# Patient Record
Sex: FEMALE | URBAN - METROPOLITAN AREA
[De-identification: names, ages, dates, MRNs, and addresses within clinical notes are randomized per-mention and may not be internally consistent; named-entity substitution may affect disease eponyms.]

---

## 2022-01-25 ENCOUNTER — NURSE TRIAGE (OUTPATIENT)
Dept: OTHER | Facility: OTHER | Age: 32
End: 2022-01-25

## 2022-01-25 DIAGNOSIS — Z20.822 SUSPECTED SEVERE ACUTE RESPIRATORY SYNDROME CORONAVIRUS 2 (SARS-COV-2) INFECTION: Primary | ICD-10-CM

## 2022-01-25 PROCEDURE — U0005 INFEC AGEN DETEC AMPLI PROBE: HCPCS | Performed by: FAMILY MEDICINE

## 2022-01-25 PROCEDURE — U0003 INFECTIOUS AGENT DETECTION BY NUCLEIC ACID (DNA OR RNA); SEVERE ACUTE RESPIRATORY SYNDROME CORONAVIRUS 2 (SARS-COV-2) (CORONAVIRUS DISEASE [COVID-19]), AMPLIFIED PROBE TECHNIQUE, MAKING USE OF HIGH THROUGHPUT TECHNOLOGIES AS DESCRIBED BY CMS-2020-01-R: HCPCS | Performed by: FAMILY MEDICINE

## 2022-01-25 NOTE — TELEPHONE ENCOUNTER
Regarding: covid symptomatic-fever,diarrhea, congestion, sore throat  ----- Message from Savanna Alberts sent at 1/25/2022 12:56 PM EST -----  "I have a fever, body aches, headache, congestion, sore throat, and diarrhea "

## 2022-01-25 NOTE — TELEPHONE ENCOUNTER
1  Were you within 6 feet or less, for up to 15 minutes or more with a person that has a confirmed COVID-19 test? Denies  2  What was the date of your exposure? N/A  3  Are you experiencing any symptoms attributed to the virus?  (Assess for SOB, cough, fever, difficulty breathing) Fever, body aches, headache, congestion, sore throat, diarrhea, onset 1/23/22  4  HIGH RISK: Do you have any history heart or lung conditions, weakened immune system, diabetes, Asthma, CHF, HIV, COPD, Chemo, renal failure, sickle cell, etc? Denies   5  PREGNANCY: Are you pregnant or did you recently give birth? Denies  6   VACCINE: "Have you gotten the COVID-19 vaccine?" If Yes ask: "Which one, how many shots, when did you get it?" 2

## 2022-01-26 ENCOUNTER — TELEPHONE (OUTPATIENT)
Dept: OTHER | Facility: OTHER | Age: 32
End: 2022-01-26

## 2022-01-26 LAB — SARS-COV-2 RNA RESP QL NAA+PROBE: POSITIVE

## 2022-01-26 NOTE — RESULT ENCOUNTER NOTE
I spoke with Rob Laura and let her know that her COVID-19 swab was positive  Continue symptomatic treatment   Advised she implement home isolation measures including

## 2022-01-26 NOTE — TELEPHONE ENCOUNTER
Your test for the novel coronavirus, also known as COVID-19, was positive  The sample showed that the virus was present  Positive COVID-19 test results are reportable to the [place] Department of Health  You may receive a call from trained public health staff to conduct an interview  It is important to answer their call  They will ask you to verify who you are  During the call they will ask you about what symptoms you have, what you did before you got sick, and who you were close to while sick  The health department does this to make sure everyone stays healthy and to reduce the spread of the virus  If you would like to verify if the caller does in fact work in contact tracing, call the [address] [place] [address] at [employer] ([phone])  For additional information, please visit the [facility]  website: [email or web address] [email or web address]     If you have any additional questions, we can schedule a virtual visit for you with a provider or call the [facility] hotline [phone], option 7, for care advice    For additional information, please visit the Coronavirus FAQ on the [employer] home page ([facility]  org)

## 2024-04-11 ENCOUNTER — OFFICE VISIT (OUTPATIENT)
Dept: URGENT CARE | Facility: CLINIC | Age: 34
End: 2024-04-11
Payer: COMMERCIAL

## 2024-04-11 VITALS
WEIGHT: 205 LBS | OXYGEN SATURATION: 98 % | HEIGHT: 66 IN | TEMPERATURE: 97.5 F | RESPIRATION RATE: 18 BRPM | SYSTOLIC BLOOD PRESSURE: 141 MMHG | BODY MASS INDEX: 32.95 KG/M2 | HEART RATE: 123 BPM | DIASTOLIC BLOOD PRESSURE: 96 MMHG

## 2024-04-11 DIAGNOSIS — J40 BRONCHITIS: Primary | ICD-10-CM

## 2024-04-11 PROCEDURE — 99213 OFFICE O/P EST LOW 20 MIN: CPT | Performed by: PHYSICIAN ASSISTANT

## 2024-04-11 RX ORDER — BUPROPION HYDROCHLORIDE 300 MG/1
300 TABLET ORAL DAILY
COMMUNITY
Start: 2024-03-20 | End: 2025-03-20

## 2024-04-11 RX ORDER — ALBUTEROL SULFATE 90 UG/1
2 AEROSOL, METERED RESPIRATORY (INHALATION) EVERY 6 HOURS PRN
Qty: 8.5 G | Refills: 0 | Status: SHIPPED | OUTPATIENT
Start: 2024-04-11

## 2024-04-11 RX ORDER — AZITHROMYCIN 250 MG/1
TABLET, FILM COATED ORAL
Qty: 6 TABLET | Refills: 0 | Status: SHIPPED | OUTPATIENT
Start: 2024-04-11 | End: 2024-04-15

## 2024-04-11 RX ORDER — ESCITALOPRAM OXALATE 10 MG/1
10 TABLET ORAL DAILY
COMMUNITY
Start: 2024-03-20

## 2024-04-11 RX ORDER — METHYLPHENIDATE HYDROCHLORIDE 27 MG/1
27 TABLET, EXTENDED RELEASE ORAL
COMMUNITY
Start: 2024-03-20

## 2024-04-11 NOTE — PROGRESS NOTES
[facility]        NAME: Anastacia Jacob is a 33 y.o. female  : 1990    MRN: [id number]  DATE: 2024  TIME: 5:48 PM    Assessment and Plan   Bronchitis [J40]  1. Bronchitis  azithromycin (ZITHROMAX) 250 mg tablet    albuterol (ProAir HFA) 90 mcg/act inhaler            Patient Instructions     Take medicine as prescribed  Follow up with PCP in 3-5 days.  Proceed to  ER if symptoms worsen.    If tests have been performed at [facility], our office will contact you with results if changes need to be made to the care plan discussed with you at the visit.  You can review your full results on [facility].    Chief Complaint     Chief Complaint   Patient presents with    chest congestion     Chest congestion, cough and chest feels tight since Monday   Pneumonia in Nov         History of Present Illness       Cough  This is a new problem. Episode onset: 4 days ago. Associated symptoms include chills, myalgias, nasal congestion, rhinorrhea, a sore throat and wheezing. Pertinent negatives include no ear pain or fever. Treatments tried: otc cold and flu medicine. Her past medical history is significant for pneumonia (). There is no history of asthma.       Review of Systems   Review of Systems   Constitutional:  Positive for chills. Negative for fatigue and fever.   HENT:  Positive for rhinorrhea and sore throat. Negative for congestion and ear pain.    Respiratory:  Positive for cough and wheezing. Negative for chest tightness.    Cardiovascular:  Negative for palpitations.   Gastrointestinal:  Negative for abdominal pain, diarrhea, nausea and vomiting.   Musculoskeletal:  Positive for myalgias.         Current Medications       Current Outpatient Medications:     albuterol (ProAir HFA) 90 mcg/act inhaler, Inhale 2 puffs every 6 (six) hours as needed for wheezing, Disp: 8.5 g, Rfl: 0    azithromycin (ZITHROMAX) 250 mg tablet, Take 2 tablets today then 1 tablet daily x 4 days, Disp: 6 tablet,  "Rfl: 0    buPROPion (WELLBUTRIN XL) 300 mg 24 hr tablet, Take 300 mg by mouth daily, Disp: , Rfl:     escitalopram (LEXAPRO) 10 mg tablet, Take 10 mg by mouth daily, Disp: , Rfl:     Methylphenidate HCl ER 27 MG TB24, Take 27 mg by mouth, Disp: , Rfl:     Current Allergies     Allergies as of 04/11/2024 - Reviewed 04/11/2024   Allergen Reaction Noted    Lorazepam Vomiting 12/25/2012            The following portions of the patient's history were reviewed and updated as appropriate: allergies, current medications, past family history, past medical history, past social history, past surgical history and problem list.     History reviewed. No pertinent past medical history.    History reviewed. No pertinent surgical history.    History reviewed. No pertinent family history.      Medications have been verified.        Objective   /96   Pulse (!) 123   Temp 97.5 °F (36.4 °C) (Tympanic)   Resp 18   Ht 5' 6\" (1.676 m)   Wt 93 kg (205 lb)   LMP 04/11/2024   SpO2 98%   BMI 33.09 kg/m²   Patient's last menstrual period was 04/11/2024.       Physical Exam     Physical Exam  Constitutional:       Appearance: She is well-developed.   HENT:      Head: Normocephalic.      Right Ear: Hearing, tympanic membrane, ear canal and external ear normal. There is no impacted cerumen.      Left Ear: Hearing, tympanic membrane, ear canal and external ear normal. There is no impacted cerumen.      Nose: No mucosal edema, congestion or rhinorrhea.      Right Sinus: Maxillary sinus tenderness present.      Left Sinus: Maxillary sinus tenderness present.      Mouth/Throat:      Mouth: Mucous membranes are moist.      Pharynx: No oropharyngeal exudate or posterior oropharyngeal erythema.      Tonsils: No tonsillar exudate.   Cardiovascular:      Rate and Rhythm: Normal rate and regular rhythm.      Heart sounds: Normal heart sounds. No murmur heard.     No friction rub. No gallop.   Pulmonary:      Effort: Pulmonary effort is normal. " No respiratory distress.      Breath sounds: No stridor. Wheezing (scattered expiratory) present. No decreased breath sounds, rhonchi or rales.   Abdominal:      General: Bowel sounds are normal. There is no distension.      Palpations: Abdomen is soft. There is no mass.      Tenderness: There is no abdominal tenderness. There is no guarding or rebound.   Lymphadenopathy:      Cervical: No cervical adenopathy.      Right cervical: No superficial cervical adenopathy.     Left cervical: No superficial cervical adenopathy.

## 2024-04-15 ENCOUNTER — OFFICE VISIT (OUTPATIENT)
Dept: URGENT CARE | Facility: CLINIC | Age: 34
End: 2024-04-15
Payer: COMMERCIAL

## 2024-04-15 ENCOUNTER — APPOINTMENT (OUTPATIENT)
Dept: RADIOLOGY | Facility: CLINIC | Age: 34
End: 2024-04-15
Payer: COMMERCIAL

## 2024-04-15 ENCOUNTER — TELEPHONE (OUTPATIENT)
Age: 34
End: 2024-04-15

## 2024-04-15 VITALS
HEIGHT: 66 IN | HEART RATE: 72 BPM | WEIGHT: 203.2 LBS | BODY MASS INDEX: 32.66 KG/M2 | RESPIRATION RATE: 18 BRPM | TEMPERATURE: 97.8 F | OXYGEN SATURATION: 98 % | DIASTOLIC BLOOD PRESSURE: 86 MMHG | SYSTOLIC BLOOD PRESSURE: 128 MMHG

## 2024-04-15 DIAGNOSIS — S99.922A INJURY OF LEFT GREAT TOE, INITIAL ENCOUNTER: Primary | ICD-10-CM

## 2024-04-15 DIAGNOSIS — M79.672 LEFT FOOT PAIN: ICD-10-CM

## 2024-04-15 PROCEDURE — 99213 OFFICE O/P EST LOW 20 MIN: CPT

## 2024-04-15 PROCEDURE — 73630 X-RAY EXAM OF FOOT: CPT

## 2024-04-15 NOTE — PROGRESS NOTES
[facility]        NAME: Anastacia Jacob is a 33 y.o. female  : 1990    MRN: [id number]  DATE: April 15, 2024  TIME: 8:33 AM    Assessment and Plan   Injury of left great toe, initial encounter [S99.922A]  1. Injury of left great toe, initial encounter  Ambulatory Referral to Orthopedic Surgery      2. Left foot pain  XR foot 3+ vw left      Discussed with patient that X-ray appears negative for acute fracture. Recommended ice, elevation, ibuprofen, and buddy taping as needed. Work note provided. Ortho referral provided for if symptoms persist.   Patient Instructions   Initial read of Xray appears negative, but still waiting on official impression.   Will call patient if there are any acute findings.   Continue with Tylenol for pain control.   Apply ice and heat for comfort.   Referral provided for orthopedic evaluation.  Follow up with PCP in 3-5 days.  Proceed to  ER if symptoms worsen.    If tests have been performed at [facility], our office will contact you with results if changes need to be made to the care plan discussed with you at the visit.  [facility].    Chief Complaint     Chief Complaint   Patient presents with    Foot Pain     Left foot and toe pain from dropping cement bird bath on foot yesterday        History of Present Illness       Patient is a 33-year-old female presenting complaining of left foot and toe pain after dropping a cement bird bath on her left great toe.   She states she was not wearing shoes when this occurred. She states the pain is located over the distal aspect of the toe, along with the base of the toe. She notes that the bird bath did cause some bleeding over the lateral aspect of the nailbed when the bird bath fell on her foot. She states it bled a little bit but now the bleeding is controlled. She has been trying antibiotic ointment so far, with some relief. She does note there is numbness at the base of the great toe.  "  Denies previous injury to left foot.         Review of Systems   Review of Systems   Constitutional:  Negative for chills and fever.   HENT: Negative.     Eyes: Negative.    Respiratory:  Negative for cough, shortness of breath, wheezing and stridor.    Cardiovascular: Negative.    Gastrointestinal: Negative.    Genitourinary: Negative.    Musculoskeletal: Negative.  Negative for myalgias.   Skin:  Positive for color change and wound. Negative for rash.   Neurological:  Negative for seizures and syncope.   All other systems reviewed and are negative.        Current Medications       Current Outpatient Medications:     albuterol (ProAir HFA) 90 mcg/act inhaler, Inhale 2 puffs every 6 (six) hours as needed for wheezing, Disp: 8.5 g, Rfl: 0    azithromycin (ZITHROMAX) 250 mg tablet, Take 2 tablets today then 1 tablet daily x 4 days, Disp: 6 tablet, Rfl: 0    buPROPion (WELLBUTRIN XL) 300 mg 24 hr tablet, Take 300 mg by mouth daily, Disp: , Rfl:     escitalopram (LEXAPRO) 10 mg tablet, Take 10 mg by mouth daily, Disp: , Rfl:     Methylphenidate HCl ER 27 MG TB24, Take 27 mg by mouth, Disp: , Rfl:     Current Allergies     Allergies as of 04/15/2024 - Reviewed 04/15/2024   Allergen Reaction Noted    Lorazepam Vomiting 12/25/2012            The following portions of the patient's history were reviewed and updated as appropriate: allergies, current medications, past family history, past medical history, past social history, past surgical history and problem list.     History reviewed. No pertinent past medical history.    History reviewed. No pertinent surgical history.    History reviewed. No pertinent family history.      Medications have been verified.        Objective   /86   Pulse 72   Temp 97.8 °F (36.6 °C) (Tympanic)   Resp 18   Ht 5' 6\" (1.676 m)   Wt 92.2 kg (203 lb 3.2 oz)   LMP 04/11/2024   SpO2 98%   BMI 32.80 kg/m²   Patient's last menstrual period was 04/11/2024.       Physical Exam "     Physical Exam  Vitals and nursing note reviewed.   Constitutional:       General: She is not in acute distress.     Appearance: Normal appearance. She is normal weight. She is not ill-appearing, toxic-appearing or diaphoretic.   HENT:      Head: Normocephalic and atraumatic.      Right Ear: External ear normal.      Left Ear: External ear normal.      Nose: Nose normal.      Mouth/Throat:      Mouth: Mucous membranes are moist.   Eyes:      Conjunctiva/sclera: Conjunctivae normal.   Cardiovascular:      Rate and Rhythm: Normal rate.      Pulses: Normal pulses.   Pulmonary:      Effort: Pulmonary effort is normal.      Breath sounds: Normal breath sounds.   Musculoskeletal:         General: Tenderness (over the distal aspect of the left great toe and the base) and signs of injury (mild ecchymosis present over great toe) present. No swelling or deformity.      Cervical back: Normal range of motion.      Left foot: Normal range of motion and normal capillary refill. Tenderness present. No swelling, deformity, bunion, Charcot foot, foot drop, prominent metatarsal heads, laceration or crepitus. Normal pulse.   Skin:     General: Skin is warm and dry.      Capillary Refill: Capillary refill takes less than 2 seconds.   Neurological:      General: No focal deficit present.      Mental Status: She is alert. Mental status is at baseline.   Psychiatric:         Mood and Affect: Mood normal.         Behavior: Behavior normal.

## 2024-04-15 NOTE — TELEPHONE ENCOUNTER
Caller: Anastacia Jacob    Doctor: Krishna    Reason for call: Anastacia injured her toe over the weekend .  She went to  and was told that she has a bone contusion of the left great toe.  Can we do a force on?  I cannot get her in any time soon.  Thank you.     Call back#: [phone]

## 2024-05-03 DIAGNOSIS — J40 BRONCHITIS: ICD-10-CM

## 2024-06-07 ENCOUNTER — OFFICE VISIT (OUTPATIENT)
Dept: URGENT CARE | Facility: CLINIC | Age: 34
End: 2024-06-07
Payer: COMMERCIAL

## 2024-06-07 VITALS
DIASTOLIC BLOOD PRESSURE: 89 MMHG | BODY MASS INDEX: 32.5 KG/M2 | TEMPERATURE: 97.1 F | SYSTOLIC BLOOD PRESSURE: 133 MMHG | OXYGEN SATURATION: 99 % | HEIGHT: 66 IN | WEIGHT: 202.2 LBS | RESPIRATION RATE: 18 BRPM | HEART RATE: 62 BPM

## 2024-06-07 DIAGNOSIS — L23.7 ALLERGIC CONTACT DERMATITIS DUE TO PLANTS, EXCEPT FOOD: Primary | ICD-10-CM

## 2024-06-07 PROCEDURE — 99213 OFFICE O/P EST LOW 20 MIN: CPT

## 2024-06-07 RX ORDER — PREDNISONE 10 MG/1
TABLET ORAL
Qty: 45 TABLET | Refills: 0 | Status: SHIPPED | OUTPATIENT
Start: 2024-06-07 | End: 2024-06-22

## 2024-06-07 NOTE — PATIENT INSTRUCTIONS
Calamine lotion or benadryl topical to help relieve itching.     IF at any time your vision becomes blurry or you have a sensation as if something is in your eye please proceed to the ER or your eye doctor immediately.     Take prednisone as directed and for the full course as ordered.     May take benadryl over the counter as needed for itching.

## 2024-06-07 NOTE — PROGRESS NOTES
[facility]        NAME: Anastacia Jacob is a 33 y.o. female  : 1990    MRN: [id number]  DATE: 2024  TIME: 11:17 AM    Assessment and Plan   Allergic contact dermatitis due to plants, except food [L23.7]  1. Allergic contact dermatitis due to plants, except food  predniSONE 10 mg tablet            Patient Instructions     Calamine lotion or benadryl topical to help relieve itching.     IF at any time your vision becomes blurry or you have a sensation as if something is in your eye please proceed to the ER or your eye doctor immediately.     Take prednisone as directed and for the full course as ordered.     May take benadryl over the counter as needed for itching.     Follow up with PCP in 3-5 days.    Proceed to  ER if symptoms worsen.    Chief Complaint     Chief Complaint   Patient presents with    Rash     General rash x 1 week that has been spreading          History of Present Illness       33-year-old female arrives with a complaint of generalized rash x 1 week and reports that it has been spreading.  Patient states she was outside pulling weeds last week on Wednesday and Thursday and again this week on Monday and Tuesday.  Rash began last week on Thursday and has been getting worse since that time.  Rash started on right forearm and has spread to bilateral arms, bilateral legs, and patient's face.    Rash  Pertinent negatives include no congestion, cough, diarrhea, fatigue, fever, shortness of breath, sore throat or vomiting.       Review of Systems   Review of Systems   Constitutional:  Negative for activity change, chills, fatigue and fever.   HENT:  Negative for congestion, ear discharge, ear pain, sore throat and trouble swallowing.    Eyes:  Positive for redness (right). Negative for photophobia, pain, discharge, itching and visual disturbance.        Glasses for vision   Respiratory:  Negative for cough and shortness of breath.    Gastrointestinal:  Negative for abdominal pain,  "diarrhea, nausea and vomiting.   Skin:  Positive for rash.         Current Medications       Current Outpatient Medications:     buPROPion (WELLBUTRIN XL) 300 mg 24 hr tablet, Take 300 mg by mouth daily, Disp: , Rfl:     escitalopram (LEXAPRO) 10 mg tablet, Take 10 mg by mouth daily, Disp: , Rfl:     Methylphenidate HCl ER 27 MG TB24, Take 27 mg by mouth, Disp: , Rfl:     predniSONE 10 mg tablet, Take 5 tablets (50 mg total) by mouth daily for 3 days, THEN 4 tablets (40 mg total) daily for 3 days, THEN 3 tablets (30 mg total) daily for 3 days, THEN 2 tablets (20 mg total) daily for 3 days, THEN 1 tablet (10 mg total) daily for 3 days., Disp: 45 tablet, Rfl: 0    albuterol (ProAir HFA) 90 mcg/act inhaler, Inhale 2 puffs every 6 (six) hours as needed for wheezing (Patient not taking: Reported on 6/7/2024), Disp: 8.5 g, Rfl: 0    Current Allergies     Allergies as of 06/07/2024 - Reviewed 06/07/2024   Allergen Reaction Noted    Lorazepam Vomiting 12/25/2012            The following portions of the patient's history were reviewed and updated as appropriate: allergies, current medications, past family history, past medical history, past social history, past surgical history and problem list.     History reviewed. No pertinent past medical history.    History reviewed. No pertinent surgical history.    History reviewed. No pertinent family history.      Medications have been verified.        Objective   /89   Pulse 62   Temp (!) 97.1 °F (36.2 °C) (Temporal)   Resp 18   Ht 5' 6\" (1.676 m)   Wt 91.7 kg (202 lb 3.2 oz)   SpO2 99%   BMI 32.64 kg/m²        Physical Exam     Physical Exam  Vitals and nursing note reviewed.   Constitutional:       General: She is not in acute distress.     Appearance: Normal appearance. She is normal weight. She is not ill-appearing, toxic-appearing or diaphoretic.   HENT:      Head: Normocephalic and atraumatic.      Right Ear: Hearing and external ear normal.      Left Ear: " Hearing and external ear normal.      Nose: Nose normal.      Comments: Itching to tip of nose-slightly reddened     Mouth/Throat:      Lips: Pink. No lesions.      Mouth: Mucous membranes are moist.      Tongue: No lesions. Tongue does not deviate from midline.      Palate: No mass and lesions.      Pharynx: Oropharynx is clear. Uvula midline.   Eyes:      Extraocular Movements: Extraocular movements intact.      Conjunctiva/sclera: Conjunctivae normal.      Pupils: Pupils are equal, round, and reactive to light.   Cardiovascular:      Rate and Rhythm: Normal rate and regular rhythm.      Pulses: Normal pulses.      Heart sounds: Normal heart sounds.   Pulmonary:      Effort: Pulmonary effort is normal.      Breath sounds: Normal breath sounds.   Abdominal:      General: Abdomen is flat. Bowel sounds are normal.      Palpations: Abdomen is soft.   Musculoskeletal:         General: Normal range of motion.      Cervical back: Normal range of motion and neck supple.   Skin:     General: Skin is warm and dry.      Capillary Refill: Capillary refill takes less than 2 seconds.      Findings: Rash (widespread rash on face, bilateral arms, bilateral legs) present.   Neurological:      General: No focal deficit present.      Mental Status: She is alert and oriented to person, place, and time.

## 2024-08-01 RX ORDER — ALBUTEROL SULFATE 90 UG/1
AEROSOL, METERED RESPIRATORY (INHALATION)
OUTPATIENT
Start: 2024-08-01

## 2024-10-13 ENCOUNTER — OFFICE VISIT (OUTPATIENT)
Dept: URGENT CARE | Facility: CLINIC | Age: 34
End: 2024-10-13
Payer: COMMERCIAL

## 2024-10-13 ENCOUNTER — APPOINTMENT (OUTPATIENT)
Dept: RADIOLOGY | Facility: CLINIC | Age: 34
End: 2024-10-13
Payer: COMMERCIAL

## 2024-10-13 VITALS
RESPIRATION RATE: 20 BRPM | HEART RATE: 90 BPM | SYSTOLIC BLOOD PRESSURE: 130 MMHG | OXYGEN SATURATION: 99 % | WEIGHT: 197 LBS | DIASTOLIC BLOOD PRESSURE: 84 MMHG | BODY MASS INDEX: 31.8 KG/M2 | TEMPERATURE: 97.5 F

## 2024-10-13 DIAGNOSIS — R06.02 SOB (SHORTNESS OF BREATH): ICD-10-CM

## 2024-10-13 DIAGNOSIS — M54.9 UPPER BACK PAIN ON LEFT SIDE: Primary | ICD-10-CM

## 2024-10-13 DIAGNOSIS — M54.9 UPPER BACK PAIN ON LEFT SIDE: ICD-10-CM

## 2024-10-13 LAB
ATRIAL RATE: 69 BPM
P AXIS: 54 DEGREES
PR INTERVAL: 136 MS
QRS AXIS: 12 DEGREES
QRSD INTERVAL: 98 MS
QT INTERVAL: 392 MS
QTC INTERVAL: 420 MS
T WAVE AXIS: 33 DEGREES
VENTRICULAR RATE: 69 BPM

## 2024-10-13 PROCEDURE — 93010 ELECTROCARDIOGRAM REPORT: CPT | Performed by: STUDENT IN AN ORGANIZED HEALTH CARE EDUCATION/TRAINING PROGRAM

## 2024-10-13 PROCEDURE — 71046 X-RAY EXAM CHEST 2 VIEWS: CPT

## 2024-10-13 PROCEDURE — 93005 ELECTROCARDIOGRAM TRACING: CPT | Performed by: PHYSICIAN ASSISTANT

## 2024-10-13 PROCEDURE — 99213 OFFICE O/P EST LOW 20 MIN: CPT | Performed by: PHYSICIAN ASSISTANT

## 2024-10-13 RX ORDER — FERROUS SULFATE 325(65) MG
325 TABLET, DELAYED RELEASE (ENTERIC COATED) ORAL 2 TIMES DAILY
COMMUNITY

## 2024-10-13 RX ORDER — METHYLPHENIDATE HYDROCHLORIDE 27 MG/1
27 TABLET ORAL EVERY MORNING
COMMUNITY
Start: 2024-09-04

## 2024-10-13 RX ORDER — RIZATRIPTAN BENZOATE 10 MG/1
10 TABLET ORAL
COMMUNITY
Start: 2024-09-24

## 2024-10-13 RX ORDER — BLOOD-GLUCOSE SENSOR
EACH MISCELLANEOUS
COMMUNITY
Start: 2024-07-17

## 2024-10-13 RX ORDER — PROPRANOLOL HYDROCHLORIDE 60 MG/1
60 CAPSULE, EXTENDED RELEASE ORAL DAILY
COMMUNITY
Start: 2024-09-24 | End: 2025-09-24

## 2024-10-13 RX ORDER — PANTOPRAZOLE SODIUM 40 MG/1
TABLET, DELAYED RELEASE ORAL
COMMUNITY
Start: 2024-08-28

## 2024-10-13 RX ORDER — HYDROXYZINE HYDROCHLORIDE 10 MG/1
10 TABLET, FILM COATED ORAL DAILY PRN
COMMUNITY
Start: 2024-01-25

## 2024-10-13 RX ORDER — IBUPROFEN 200 MG
200 TABLET ORAL EVERY 6 HOURS PRN
COMMUNITY

## 2024-10-13 NOTE — PROGRESS NOTES
[facility]        NAME: Anastacia Jacob is a 34 y.o. female  : 1990    MRN: [id number]  DATE: 2024  TIME: 12:55 PM    /84   Pulse 90   Temp 97.5 °F (36.4 °C)   Resp 20   Wt 89.4 kg (197 lb)   SpO2 99%   BMI 31.80 kg/m²     Assessment and Plan   Upper back pain on left side [M54.9]  1. Upper back pain on left side  ECG 12 lead    XR chest pa and lateral      2. SOB (shortness of breath)              Patient Instructions       Follow up with PCP in 3-5 days.  Proceed to  ER if symptoms worsen.    Chief Complaint     Chief Complaint   Patient presents with    Back Pain     Upper left back pain that radiates to the front. It hurts only when she takes a deep breath. Started about 1 month ago. Taking ibuprofen at times. Was at chiropractor twice and was told she had a rib out of place. Hard to take a deep breath. No recent illnesses         History of Present Illness       Pt with one month of left  mid rib pain , difficulty taking deep breath when supine,  + sob when supine , has had same x 1 month , no trauma . Pt with intermittent diffuculty taking deep breaths sometimes         Review of Systems   Review of Systems   Constitutional: Negative.    HENT: Negative.     Eyes: Negative.    Respiratory: Negative.     Cardiovascular: Negative.    Gastrointestinal: Negative.    Endocrine: Negative.    Genitourinary: Negative.    Musculoskeletal: Negative.    Skin: Negative.    Allergic/Immunologic: Negative.    Neurological: Negative.    Hematological: Negative.    Psychiatric/Behavioral: Negative.     All other systems reviewed and are negative.        Current Medications       Current Outpatient Medications:     albuterol (ProAir HFA) 90 mcg/act inhaler, Inhale 2 puffs every 6 (six) hours as needed for wheezing, Disp: 8.5 g, Rfl: 0    buPROPion (WELLBUTRIN XL) 300 mg 24 hr tablet, Take 300 mg by mouth daily, Disp: , Rfl:     Cholecalciferol 125 MCG (5000 UT) TABS, Take by mouth daily,  Disp: , Rfl:     Continuous Glucose Sensor (FreeStyle Florian 3 Sensor) MISC, 1 EACH BY MISCELLANEOUS ROUTE EVERY 14 (FOURTEEN) DAYS., Disp: , Rfl:     escitalopram (LEXAPRO) 10 mg tablet, Take 10 mg by mouth daily, Disp: , Rfl:     ferrous sulfate 325 (65 FE) MG EC tablet, Take 325 mg by mouth 2 (two) times a day, Disp: , Rfl:     hydrOXYzine HCL (ATARAX) 10 mg tablet, Take 10 mg by mouth Once daily as needed, Disp: , Rfl:     ibuprofen (MOTRIN) 200 mg tablet, Take 200 mg by mouth every 6 (six) hours as needed, Disp: , Rfl:     methylphenidate (CONCERTA) 27 MG ER tablet, Take 27 mg by mouth every morning, Disp: , Rfl:     pantoprazole (PROTONIX) 40 mg tablet, 1 (ONE) TABLET BY MOUTH BEFORE BREAKFAST (30 MIN), Disp: , Rfl:     propranolol (INDERAL LA) 60 mg 24 hr capsule, Take 60 mg by mouth daily, Disp: , Rfl:     rizatriptan (MAXALT) 10 mg tablet, Take 10 mg by mouth, Disp: , Rfl:     Current Allergies     Allergies as of 10/13/2024 - Reviewed 10/13/2024   Allergen Reaction Noted    Lorazepam Vomiting 12/25/2012            The following portions of the patient's history were reviewed and updated as appropriate: allergies, current medications, past family history, past medical history, past social history, past surgical history and problem list.     Past Medical History:   Diagnosis Date    Anxiety     Depression     Migraines        History reviewed. No pertinent surgical history.    History reviewed. No pertinent family history.      Medications have been verified.        Objective   /84   Pulse 90   Temp 97.5 °F (36.4 °C)   Resp 20   Wt 89.4 kg (197 lb)   SpO2 99%   BMI 31.80 kg/m²        Physical Exam     Physical Exam  Vitals and nursing note reviewed.   Constitutional:       Appearance: Normal appearance. She is normal weight.      Comments: No blood clot history   No family hx of blood clotting disorders     Discussed with pt about possible er visit for possible d-dimer to r/o p/e with pt stating  when supine get sob and feeling left mid rib pain    HENT:      Head: Normocephalic and atraumatic.   Cardiovascular:      Rate and Rhythm: Normal rate and regular rhythm.      Pulses: Normal pulses.      Heart sounds: Normal heart sounds.   Pulmonary:      Effort: Pulmonary effort is normal.      Breath sounds: Normal breath sounds.   Abdominal:      General: Abdomen is flat. Bowel sounds are normal.      Palpations: Abdomen is soft.   Musculoskeletal:         General: Normal range of motion.      Cervical back: Normal range of motion and neck supple.   Skin:     General: Skin is warm.      Capillary Refill: Capillary refill takes less than 2 seconds.   Neurological:      Mental Status: She is alert and oriented to person, place, and time.